# Patient Record
(demographics unavailable — no encounter records)

---

## 2024-11-13 NOTE — ADDENDUM
[FreeTextEntry1] : 11/13/2024: Given that the patient is maintaining sinus rhythm, no opposition to a temporary discontinuation of oral anticoagulation for needed invasive procedures.

## 2024-11-13 NOTE — HISTORY OF PRESENT ILLNESS
[FreeTextEntry1] : Mr. Pro Caballero is a 68-year-old man with a prior transient ischemic attack, a patent foramen ovale, hypertension, hyperlipidemia, diabetes, coronary artery disease (status post a percutaneous coronary to the mid left anterior descending artery in August of 2023 by Dr. Deng Ramesh), a prior wedge resection of his right lung in March of 2024, typical-appearing atrial flutter and atrial fibrillation. He presents today for follow-up after undergoing a catheter ablation on November 14th, 2023.  To briefly summarize his history, he presented to [facility] in October with typical-appearing atrial flutter. He was transferred to [facility] where his atrial flutter degenerated into atrial fibrillation. He underwent a direct current cardioversion on October 31st, 2023. Following the cardioversion, he had a prolonged sinus arrest and received chest compressions. He then underwent a catheter ablation on November 14th, 2023. His ablation included pulmonary vein isolation, posterior wall isolation and cavotricuspid isthmus ablation. When he saw me in follow-up in December of 2023 he reported nausea, heartburn, weight loss and chest pain. He underwent an endoscopy on January 23rd, 2024 that revealed a normal esophagus with erythematous mucosa in the gastric antrum.  Since his last visit in March of 2024, he has been feeling well. He reports that his watch reported to him that his heart rate became elevated to the 160s at night during sleep. He underwent a home sleep study in July of 2024 that did not reveal any evidence of significant sleep disordered breathing. Based on his watch notifications, Dr. García ordered a Zio monitor. He wore this monitor from July 18th, 2024 through July 23rd, 2024. Atrial fibrillation was not detected during the monitoring period. No reports of chest pain, shortness of breath, dizziness, palpitations, lightheadedness, pre-syncope or syncope.  CHADS2-VASC: 6 (Age: 1, DM: 1, HTN: 1, TIA: 2, CAD: 1)

## 2024-11-13 NOTE — CARDIOLOGY SUMMARY
[de-identified] : ECG from 11/8/2024: Sinus rhythm at 65bpm with possible APC, poor quality ECG ECG from 3/22/2024: Sinus Rhythm ECG from 12/29/2023: Sinus rhythm at 88bpm, incomplete right bundle branch block [de-identified] : Zio from 7/18/2024-7/23/2023: min HR: 43, max HR: 138, mean HR: 61, no patient triggered events, no AF, 3.4% APCs, < 1% PVCs [de-identified] : TTE from 12/4/2023: Normal LV cavity, LVSF is low normal, basal inferior segment is abnormal, normal RV cavity size and wall thickness, no pericardial effusion, no LV thrombus, normal LA, normal RA, trace MR [de-identified] : CT Heart: no STEPHANIE thrombus, mediastinal hilar LAD, pulmonary nodules up to 5mm in LLL (this result was communicated with the patient's outpatient Pulmonologist as the time that the study was performed) [de-identified] : Ablation 11/14/2023: PVI (additional lesions on the posterior cale for the L PVs) and the cale for the R PVs, acute reconnection RIPV), PW isolation (entrance and exit block confirmed), CTI  DCCV from 10/31/2023: for AF - long conversion pause, chest compressions performed briefly [de-identified] : [facility] from 8/18/2023: severe lesion in mLAD treated with ESTELA x 1  [de-identified] : Home sleep study from July 25th, 2024 - no significant sleep disordered breathing  EGD from 1/23/2024: normal esophagus, erythematous mucosa in the gastric antrum - biopsied, normal second part of the duodenum

## 2024-11-13 NOTE — HISTORY OF PRESENT ILLNESS
[FreeTextEntry1] : Mr. Pro Caballero is a 68-year-old man with a prior transient ischemic attack, a patent foramen ovale, hypertension, hyperlipidemia, diabetes, coronary artery disease (status post a percutaneous coronary to the mid left anterior descending artery in August of 2023 by Dr. Deng Ramesh), a prior wedge resection of his right lung in March of 2024, typical-appearing atrial flutter and atrial fibrillation. He presents today for follow-up after undergoing a catheter ablation on November 14th, 2023.  To briefly summarize his history, he presented to [facility] in October with typical-appearing atrial flutter. He was transferred to [facility] where his atrial flutter degenerated into atrial fibrillation. He underwent a direct current cardioversion on October 31st, 2023. Following the cardioversion, he had a prolonged sinus arrest and received chest compressions. He then underwent a catheter ablation on November 14th, 2023. His ablation included pulmonary vein isolation, posterior wall isolation and cavotricuspid isthmus ablation. When he saw me in follow-up in December of 2023 he reported nausea, heartburn, weight loss and chest pain. He underwent an endoscopy on January 23rd, 2024 that revealed a normal esophagus with erythematous mucosa in the gastric antrum.  Since his last visit in March of 2024, he has been feeling well. He reports that his watch reported to him that his heart rate became elevated to the 160s at night during sleep. He underwent a home sleep study in July of 2024 that did not reveal any evidence of significant sleep disordered breathing. Based on his watch notifications, Dr. García ordered a Zio monitor. He wore this monitor from July 18th, 2024 through July 23rd, 2024. Atrial fibrillation was not detected during the monitoring period. No reports of chest pain, shortness of breath, dizziness, palpitations, lightheadedness, pre-syncope or syncope.  CHADS2-VASC: 6 (Age: 1, DM: 1, HTN: 1, TIA: 2, CAD: 1)

## 2024-11-13 NOTE — REVIEW OF SYSTEMS
[Anxiety] : anxiety [Negative] : Heme/Lymph [FreeTextEntry5] : see HPI [de-identified] : When he is anxious he feels "electric activity" in his head

## 2024-11-13 NOTE — CARDIOLOGY SUMMARY
[de-identified] : ECG from 11/8/2024: Sinus rhythm at 65bpm with possible APC, poor quality ECG ECG from 3/22/2024: Sinus Rhythm ECG from 12/29/2023: Sinus rhythm at 88bpm, incomplete right bundle branch block [de-identified] : Zio from 7/18/2024-7/23/2023: min HR: 43, max HR: 138, mean HR: 61, no patient triggered events, no AF, 3.4% APCs, < 1% PVCs [de-identified] : TTE from 12/4/2023: Normal LV cavity, LVSF is low normal, basal inferior segment is abnormal, normal RV cavity size and wall thickness, no pericardial effusion, no LV thrombus, normal LA, normal RA, trace MR [de-identified] : CT Heart: no STEPHANIE thrombus, mediastinal hilar LAD, pulmonary nodules up to 5mm in LLL (this result was communicated with the patient's outpatient Pulmonologist as the time that the study was performed) [de-identified] : Ablation 11/14/2023: PVI (additional lesions on the posterior cale for the L PVs) and the cale for the R PVs, acute reconnection RIPV), PW isolation (entrance and exit block confirmed), CTI  DCCV from 10/31/2023: for AF - long conversion pause, chest compressions performed briefly [de-identified] : [facility] from 8/18/2023: severe lesion in mLAD treated with ESTELA x 1  [de-identified] : Home sleep study from July 25th, 2024 - no significant sleep disordered breathing  EGD from 1/23/2024: normal esophagus, erythematous mucosa in the gastric antrum - biopsied, normal second part of the duodenum

## 2024-11-13 NOTE — REVIEW OF SYSTEMS
[Anxiety] : anxiety [Negative] : Heme/Lymph [FreeTextEntry5] : see HPI [de-identified] : When he is anxious he feels "electric activity" in his head

## 2024-11-13 NOTE — CARDIOLOGY SUMMARY
[de-identified] : ECG from 11/8/2024: Sinus rhythm at 65bpm with possible APC, poor quality ECG ECG from 3/22/2024: Sinus Rhythm ECG from 12/29/2023: Sinus rhythm at 88bpm, incomplete right bundle branch block [de-identified] : Zio from 7/18/2024-7/23/2023: min HR: 43, max HR: 138, mean HR: 61, no patient triggered events, no AF, 3.4% APCs, < 1% PVCs [de-identified] : TTE from 12/4/2023: Normal LV cavity, LVSF is low normal, basal inferior segment is abnormal, normal RV cavity size and wall thickness, no pericardial effusion, no LV thrombus, normal LA, normal RA, trace MR [de-identified] : CT Heart: no STEPHANIE thrombus, mediastinal hilar LAD, pulmonary nodules up to 5mm in LLL (this result was communicated with the patient's outpatient Pulmonologist as the time that the study was performed) [de-identified] : Ablation 11/14/2023: PVI (additional lesions on the posterior cale for the L PVs) and the cale for the R PVs, acute reconnection RIPV), PW isolation (entrance and exit block confirmed), CTI  DCCV from 10/31/2023: for AF - long conversion pause, chest compressions performed briefly [de-identified] : [facility] from 8/18/2023: severe lesion in mLAD treated with ESTELA x 1  [de-identified] : Home sleep study from July 25th, 2024 - no significant sleep disordered breathing  EGD from 1/23/2024: normal esophagus, erythematous mucosa in the gastric antrum - biopsied, normal second part of the duodenum

## 2024-11-13 NOTE — REVIEW OF SYSTEMS
[Anxiety] : anxiety [Negative] : Heme/Lymph [FreeTextEntry5] : see HPI [de-identified] : When he is anxious he feels "electric activity" in his head

## 2024-11-13 NOTE — REVIEW OF SYSTEMS
[Anxiety] : anxiety [Negative] : Heme/Lymph [FreeTextEntry5] : see HPI [de-identified] : When he is anxious he feels "electric activity" in his head

## 2024-11-13 NOTE — HISTORY OF PRESENT ILLNESS
[FreeTextEntry1] : Mr. Pro Caballero is a 68-year-old man with a prior transient ischemic attack, a patent foramen ovale, hypertension, hyperlipidemia, diabetes, coronary artery disease (status post a percutaneous coronary to the mid left anterior descending artery in August of 2023 by Dr. Deng Ramesh), a prior wedge resection of his right lung in March of 2024, typical-appearing atrial flutter and atrial fibrillation. He presents today for follow-up after undergoing a catheter ablation on November 14th, 2023.  To briefly summarize his history, he presented to [facility] in October with typical-appearing atrial flutter. He was transferred to [facility] where his atrial flutter degenerated into atrial fibrillation. He underwent a direct current cardioversion on October 31st, 2023. Following the cardioversion, he had a prolonged sinus arrest and received chest compressions. He then underwent a catheter ablation on November 14th, 2023. His ablation included pulmonary vein isolation, posterior wall isolation and cavotricuspid isthmus ablation. When he saw me in follow-up in December of 2023 he reported nausea, heartburn, weight loss and chest pain. He underwent an endoscopy on January 23rd, 2024 that revealed a normal esophagus with erythematous mucosa in the gastric antrum.  Since his last visit in March of 2024, he has been feeling well. He reports that his watch reported to him that his heart rate became elevated to the 160s at night during sleep. He underwent a home sleep study in July of 2024 that did not reveal any evidence of significant sleep disordered breathing. Based on his watch notifications, Dr. García ordered a Zio monitor. He wore this monitor from July 18th, 2024 through July 23rd, 2024. Atrial fibrillation was not detected during the monitoring period. No reports of chest pain, shortness of breath, dizziness, palpitations, lightheadedness, pre-syncope or syncope.  CHADS2-VASC: 6 (Age: 1, DM: 1, HTN: 1, TIA: 2, CAD: 1)

## 2024-11-13 NOTE — DISCUSSION/SUMMARY
[EKG obtained to assist in diagnosis and management of assessed problem(s)] : EKG obtained to assist in diagnosis and management of assessed problem(s) [FreeTextEntry1] : Mr. Pro Caballero is a 68-year-old man with a prior transient ischemic attack, a patent foramen ovale, hypertension, hyperlipidemia, diabetes, coronary artery disease (status post a percutaneous coronary to the mid left anterior descending artery in August of 2023 by Dr. Deng Ramesh), a prior wedge resection of his right lung in March of 2024, typical-appearing atrial flutter and atrial fibrillation. He presents today for follow-up after undergoing a catheter ablation on November 14th, 2023. He is doing well following the ablation and has not had a recurrence of atrial fibrillation.  We discussed the mechanisms of recurrences of atrial fibrillation following an ablation. His Zio monitor from July of 2024 did not reveal any evidence of atrial fibrillation. In the setting of his elevated CHADS2-VASC score (6), I recommended that he continue with Eliquis. He is not on Metoprolol due to his prolonged sinus pause following his cardioversion. I suggested that he should continue to follow with Dr. García and see me again as needed.

## 2024-11-13 NOTE — CARDIOLOGY SUMMARY
[de-identified] : ECG from 11/8/2024: Sinus rhythm at 65bpm with possible APC, poor quality ECG ECG from 3/22/2024: Sinus Rhythm ECG from 12/29/2023: Sinus rhythm at 88bpm, incomplete right bundle branch block [de-identified] : Zio from 7/18/2024-7/23/2023: min HR: 43, max HR: 138, mean HR: 61, no patient triggered events, no AF, 3.4% APCs, < 1% PVCs [de-identified] : TTE from 12/4/2023: Normal LV cavity, LVSF is low normal, basal inferior segment is abnormal, normal RV cavity size and wall thickness, no pericardial effusion, no LV thrombus, normal LA, normal RA, trace MR [de-identified] : CT Heart: no STEPHANIE thrombus, mediastinal hilar LAD, pulmonary nodules up to 5mm in LLL (this result was communicated with the patient's outpatient Pulmonologist as the time that the study was performed) [de-identified] : Ablation 11/14/2023: PVI (additional lesions on the posterior cale for the L PVs) and the cale for the R PVs, acute reconnection RIPV), PW isolation (entrance and exit block confirmed), CTI  DCCV from 10/31/2023: for AF - long conversion pause, chest compressions performed briefly [de-identified] : [facility] from 8/18/2023: severe lesion in mLAD treated with ESTELA x 1  [de-identified] : Home sleep study from July 25th, 2024 - no significant sleep disordered breathing  EGD from 1/23/2024: normal esophagus, erythematous mucosa in the gastric antrum - biopsied, normal second part of the duodenum